# Patient Record
Sex: FEMALE | Race: WHITE | Employment: FULL TIME | URBAN - METROPOLITAN AREA
[De-identification: names, ages, dates, MRNs, and addresses within clinical notes are randomized per-mention and may not be internally consistent; named-entity substitution may affect disease eponyms.]

---

## 2021-05-06 ENCOUNTER — HOSPITAL ENCOUNTER (EMERGENCY)
Age: 41
Discharge: HOME | End: 2021-05-06
Attending: EMERGENCY MEDICINE
Payer: COMMERCIAL

## 2021-05-06 VITALS
RESPIRATION RATE: 20 BRPM | DIASTOLIC BLOOD PRESSURE: 90 MMHG | SYSTOLIC BLOOD PRESSURE: 140 MMHG | TEMPERATURE: 98.7 F | OXYGEN SATURATION: 98 % | HEART RATE: 71 BPM

## 2021-05-06 DIAGNOSIS — R00.2 PALPITATIONS: Primary | ICD-10-CM

## 2021-05-06 DIAGNOSIS — F41.1 ANXIETY STATE: ICD-10-CM

## 2021-05-06 LAB
ANION GAP SERPL CALC-SCNC: 9 MMOL/L (ref 5–15)
BASOPHILS # BLD: 0.1 K/UL (ref 0–0.1)
BASOPHILS NFR BLD: 1 % (ref 0–1)
BUN SERPL-MCNC: 14 MG/DL (ref 6–20)
BUN/CREAT SERPL: 19 (ref 12–20)
CALCIUM SERPL-MCNC: 9.5 MG/DL (ref 8.5–10.1)
CHLORIDE SERPL-SCNC: 103 MMOL/L (ref 97–108)
CO2 SERPL-SCNC: 29 MMOL/L (ref 21–32)
COMMENT, HOLDF: NORMAL
CREAT SERPL-MCNC: 0.73 MG/DL (ref 0.55–1.02)
DIFFERENTIAL METHOD BLD: NORMAL
EOSINOPHIL # BLD: 0.1 K/UL (ref 0–0.4)
EOSINOPHIL NFR BLD: 2 % (ref 0–7)
ERYTHROCYTE [DISTWIDTH] IN BLOOD BY AUTOMATED COUNT: 12.4 % (ref 11.5–14.5)
GLUCOSE SERPL-MCNC: 122 MG/DL (ref 65–100)
HCT VFR BLD AUTO: 39.3 % (ref 35–47)
HGB BLD-MCNC: 13.1 G/DL (ref 11.5–16)
IMM GRANULOCYTES # BLD AUTO: 0 K/UL (ref 0–0.04)
IMM GRANULOCYTES NFR BLD AUTO: 0 % (ref 0–0.5)
LYMPHOCYTES # BLD: 1.3 K/UL (ref 0.8–3.5)
LYMPHOCYTES NFR BLD: 21 % (ref 12–49)
MCH RBC QN AUTO: 33 PG (ref 26–34)
MCHC RBC AUTO-ENTMCNC: 33.3 G/DL (ref 30–36.5)
MCV RBC AUTO: 99 FL (ref 80–99)
MONOCYTES # BLD: 0.6 K/UL (ref 0–1)
MONOCYTES NFR BLD: 10 % (ref 5–13)
NEUTS SEG # BLD: 4.2 K/UL (ref 1.8–8)
NEUTS SEG NFR BLD: 66 % (ref 32–75)
NRBC # BLD: 0 K/UL (ref 0–0.01)
NRBC BLD-RTO: 0 PER 100 WBC
PLATELET # BLD AUTO: 258 K/UL (ref 150–400)
PMV BLD AUTO: 9.5 FL (ref 8.9–12.9)
POTASSIUM SERPL-SCNC: 4.4 MMOL/L (ref 3.5–5.1)
RBC # BLD AUTO: 3.97 M/UL (ref 3.8–5.2)
SAMPLES BEING HELD,HOLD: NORMAL
SODIUM SERPL-SCNC: 141 MMOL/L (ref 136–145)
TROPONIN I SERPL-MCNC: <0.05 NG/ML
TSH SERPL DL<=0.05 MIU/L-ACNC: 3.18 UIU/ML (ref 0.36–3.74)
WBC # BLD AUTO: 6.2 K/UL (ref 3.6–11)

## 2021-05-06 PROCEDURE — 99284 EMERGENCY DEPT VISIT MOD MDM: CPT

## 2021-05-06 PROCEDURE — 74011250637 HC RX REV CODE- 250/637: Performed by: EMERGENCY MEDICINE

## 2021-05-06 PROCEDURE — 85025 COMPLETE CBC W/AUTO DIFF WBC: CPT

## 2021-05-06 PROCEDURE — 80048 BASIC METABOLIC PNL TOTAL CA: CPT

## 2021-05-06 PROCEDURE — 84484 ASSAY OF TROPONIN QUANT: CPT

## 2021-05-06 PROCEDURE — 93005 ELECTROCARDIOGRAM TRACING: CPT

## 2021-05-06 PROCEDURE — 84443 ASSAY THYROID STIM HORMONE: CPT

## 2021-05-06 RX ORDER — LORAZEPAM 0.5 MG/1
0.5 TABLET ORAL
Status: COMPLETED | OUTPATIENT
Start: 2021-05-06 | End: 2021-05-06

## 2021-05-06 RX ORDER — PROPRANOLOL HYDROCHLORIDE 40 MG/1
40 TABLET ORAL 4 TIMES DAILY
COMMUNITY

## 2021-05-06 RX ADMIN — LORAZEPAM 0.5 MG: 0.5 TABLET ORAL at 13:04

## 2021-05-06 NOTE — ED TRIAGE NOTES
Pt arrives ambulatory to ed with complaints of panic attack d/t N/T in her right arm while sitting at her desk watching tv. Symptoms lasted approx 5 mins. Symptoms of N/T have subsided. Pt anxious during triage. Denies any symptoms at this time. Pt states hx of anxiety.

## 2021-05-06 NOTE — ED PROVIDER NOTES
36 yoF hx anxiety, presenting to ED for evaluation of anxiety. Pt reports she was at work at her desk watching [address] [employer] on [place] when she felt numbness in her right hand and fingers. She reports tingling in upper lip and edge of nose. She checked her BP at the time and it was elevated, 160s/90s. No hx thyroid issues but two sisters have thyroid disease. 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 Past Medical History:   Diagnosis Date    Cholelithiasis     Tendinitis, de Quervain's     angelika. wrists       Past Surgical History:   Procedure Laterality Date    HX CHOLECYSTECTOMY  2010    HX GYN      C section    AR BREAST SURGERY PROCEDURE UNLISTED      augmentation         Family History:   Problem Relation Age of Onset    Hypertension Mother     Hypertension Father     Coronary Artery Disease Father 61        CABG       Social History     Socioeconomic History    Marital status:      Spouse name: Not on file    Number of children: Not on file    Years of education: Not on file    Highest education level: Not on file   Occupational History    Not on file   Social Needs    Financial resource strain: Not on file    Food insecurity     Worry: Not on file     Inability: Not on file    Transportation needs     Medical: Not on file     Non-medical: Not on file   Tobacco Use    Smoking status: Never Smoker    Smokeless tobacco: Never Used   Substance and Sexual Activity    Alcohol use:  Yes    Drug use: Never    Sexual activity: Not on file   Lifestyle    Physical activity     Days per week: Not on file     Minutes per session: Not on file    Stress: Not on file   Relationships    Social connections     Talks on phone: Not on file     Gets together: Not on file     Attends Buddhism service: Not on file     Active member of club or organization: Not on file     Attends meetings of clubs or organizations: Not on file     Relationship status: Not on file    Intimate partner violence     Fear of current or ex partner: Not on file     Emotionally abused: Not on file     Physically abused: Not on file     Forced sexual activity: Not on file   Other Topics Concern    Not on file   Social History Narrative    Not on file         ALLERGIES: Patient has no known allergies. Review of Systems   Constitutional: Negative for chills and fever. HENT: Negative for congestion. Eyes: Negative for visual disturbance. Respiratory: Positive for chest tightness. Negative for shortness of breath. Cardiovascular: Positive for palpitations. Negative for chest pain. Gastrointestinal: Negative for abdominal pain, nausea and vomiting. Genitourinary: Negative for dysuria and hematuria. Musculoskeletal: Negative for back pain. Skin: Negative for rash. Allergic/Immunologic: Negative for immunocompromised state. Neurological: Negative for syncope, weakness and headaches. Psychiatric/Behavioral: Negative for confusion. The patient is nervous/anxious. Vitals:    05/06/21 1236 05/06/21 1238 05/06/21 1242   BP: (!) 185/98 (!) 168/94    Pulse:   71   Resp: 20     Temp: 98.7 °F (37.1 °C)     SpO2: 99% 98%             Physical Exam  Vitals signs and nursing note reviewed. Constitutional:       General: She is not in acute distress. Appearance: She is well-developed. HENT:      Head: Normocephalic and atraumatic. Eyes:      General: No scleral icterus. Neck:      Trachea: No tracheal deviation. Cardiovascular:      Rate and Rhythm: Normal rate and regular rhythm. Heart sounds: Normal heart sounds. Pulmonary:      Effort: Pulmonary effort is normal. No respiratory distress. Breath sounds: Normal breath sounds. Abdominal:      General: There is no distension. Palpations: Abdomen is soft. Tenderness: There is no abdominal tenderness. Musculoskeletal: Normal range of motion. Skin:     General: Skin is warm and dry. Neurological:      Mental Status: She is alert and oriented to person, place, and time. Cranial Nerves: No cranial nerve deficit. Psychiatric:         Attention and Perception: She is attentive. She does not perceive auditory or visual hallucinations. Mood and Affect: Mood is anxious. Speech: Speech is rapid and pressured. Behavior: Behavior is cooperative. Thought Content: Thought content normal. Thought content does not include suicidal ideation. MDM  Number of Diagnoses or Management Options  Anxiety state  Palpitations  Diagnosis management comments: 54-year-old female history of anxiety, presenting for evaluation of chest tightness, palpitations and anxiety. Patient also reported numbness in fingertips and lips which is now resolved. Think this is most likely secondary to hyperventilation/hypercapnia. BP is elevated. Will administer Ativan 0.5 mg p.o. and reassess. EKG is nonischemic. ED EKG interpretation:  Rhythm: normal sinus rhythm; and regular . Rate (approx.): 62; Axis: normal; P wave: normal; QRS interval: normal ; ST/T wave: normal; in  Lead: All; Other findings: normal.   EKG interpreted by Monika Lozoya ED MD.       Amount and/or Complexity of Data Reviewed  Clinical lab tests: ordered      ED Course as of May 06 1349   Thu May 06, 2021   1348 BP improving after ativan 0.5mg PO.  EKG nonischemic, labs unremarkable. We will have her follow-up with primary care.     [SL]      ED Course User Index  [SL] Leonard Crain MD       Procedures          Signed By: Monika Lozoya MD     May 6, 2021

## 2021-05-06 NOTE — Clinical Note
[address] 
[facility] 
[address] [address] [phone] Work/School Note Date: 5/6/2021 To Whom It May concern: 
 
Tita Vasquez was seen and treated today in the emergency room by the following provider(s): 
Attending Provider: Mervin Fine MD. Tita Romano is excused from work/school on 05/06/21 and 05/07/21. She is medically clear to return to work/school on 5/8/2021. Sincerely, Dariel Burden MD

## 2021-05-07 LAB
ATRIAL RATE: 62 BPM
CALCULATED P AXIS, ECG09: 22 DEGREES
CALCULATED R AXIS, ECG10: 79 DEGREES
CALCULATED T AXIS, ECG11: 66 DEGREES
DIAGNOSIS, 93000: NORMAL
P-R INTERVAL, ECG05: 162 MS
Q-T INTERVAL, ECG07: 436 MS
QRS DURATION, ECG06: 92 MS
QTC CALCULATION (BEZET), ECG08: 442 MS
VENTRICULAR RATE, ECG03: 62 BPM

## 2023-05-11 RX ORDER — PROPRANOLOL HYDROCHLORIDE 40 MG/1
TABLET ORAL 4 TIMES DAILY
COMMUNITY